# Patient Record
(demographics unavailable — no encounter records)

---

## 2024-10-25 NOTE — HISTORY OF PRESENT ILLNESS
[FreeTextEntry1] : Presents with wife for follow up of cognitive impairment. Patient reports that he feels like his memory is stable, but knows it is getting worse because his wife tells him.  No other complaints. Still has a good sense of humor and no agitation or behavioral disturbance.

## 2024-10-25 NOTE — DATA REVIEWED
[de-identified] : EXAM:  MR BRAIN  PROCEDURE DATE:  11/01/2020     INTERPRETATION:  Short-term memory loss.  Technique: MRI of the brain was performed utilizing sagittal and axial T1, axial T2, axial gradient echo, axial and coronal FLAIR and diffusion imaging.  There are no prior studies available for comparison.  Findings: There are several scattered punctate foci of T2 and Flair signal hyperintensities predominantly within the periventricular white matter that are nonspecific and likely consistent with minimal to mild microvascular disease.. There is no evidence of mass-effect or midline shift. There is no evidence of intra or extra-axial fluid collection.  There is enlargement the sulci, cisterns and ventricles consistent with minimal to mild cerebral and cerebellar volume loss. There is no evidence of hydrocephalus. There is no evidence of acute infarction. Evaluation of the intracranial vascular flow-voids appear within normal limits.  Gradient echo images demonstrate no evidence of abnormal susceptibility changes within the brain parenchyma.  The visualized paranasal sinuses and bilateral mastoid air cells are clear.  Impression: Minimal to mild microvascular disease.  No evidence of acute infarction.

## 2024-10-25 NOTE — ASSESSMENT
[FreeTextEntry1] : 77-year-old man with cognitive impairment, possibly Alzheimer's disease as he does not have vascular risk factors or significant vascular disease on MRI.  -No medications at this time, has tried donepezil and memantine but stopped due to adverse effects -Importance of sleep, diet, exercise reviewed  RTC 1 year

## 2024-10-25 NOTE — PHYSICAL EXAM
[FreeTextEntry1] : General: no apparent distress Mental Status: awake and alert, speech fluent and prosodic with no paraphasic errors, oriented to name and place but not year, somewhat oriented to current events (Kami, Jagdish, Elvia), copies Luria sequence on first try, remembers 0/3 at 5 minutes Cranial Nerves: tracks in all directions, face symmetric, no dysarthria Motor: no abnormal movements, no orbiting or pronator drift Coordination: no dysmetria on finger-nose-finger testing Gait: narrow base, normal stance and stride, no Romberg

## 2025-05-01 NOTE — PHYSICAL EXAM
[FreeTextEntry1] : ============================= NEUROLOGICAL EXAMINATION ================================ [ ] GENERAL: alert, appears well, non-toxic [ ] BEHAVIOR: euthymic affect, pleasant and cooperative. Humorous ironic jokes. No disinhibition, no agitation, no apathy.  [ ] COGNITION: alert, with good basic attention (registration 3/3). Sustained attention good (counting backwards from 20). Complex attention good for WORLD reverse (5/5). Calculation excellent (5/5 very quickly). Language fluent, with normal prosody and phrase length. Comprehension intact to grammatically simple questions, to a 3-step command (3/3), and to a command crossing midline. Repetition intact (2/2 MOCA, 1/1 MMSE). Reading intact (1/1). Writing intact (1/1). Naming intact (2/2). Oriented 1/5 to time (season only; said August rather than May, said 2021 rather than 2025, did not know day/date). Oriented 5/5 to place. Short term recall for an unstructured word list was 0/3 (+1 semantic cue; +1 MCQ choice). Visuoconstruction impaired for interlocking pentagons (0/1, soraida a emir inside a pentagon). Knows president is "big, obnoxious, heavy set" (but thinks he may or may not be president right now) - he cannot list anything that Kami has done (even though he has been watching the news). Folstein MMSE 22/30. [ ] CRANIAL NERVES: PERRL, VFF (binocularly to finger count), EOMI, facial sensation intact bilaterally to PP, face symmetric, facial movements full, tongue protrudes midline [ ] MOTOR: Power 5/5 grossly all limbs without PND. mild fine postural tremor. NO bradykinesia, NO rigidity [ ] CO-ORDINATION: FNF intact bilaterally, [ ] GAIT: mildly stooped posture. narrow based gait. not shuffling or magnetic

## 2025-05-01 NOTE — HISTORY OF PRESENT ILLNESS
[FreeTextEntry1] : =========================== History of Presenting Illness: =========================== 77M followed by Dr Darden for Alzheimer's dementia, here to establish care with cognitive neurology. Per quick chart review - Q repetition started 2019 and had issues remembering days for alternate side parking; MRI brain 2020 with mild SVID only. MOCA 21/30 in 2021. Had mild bilateral action tremor 2022, thought to be physiologic or ET (no parkinsonism on exam). Most recent visit Oct 2024, pt with year disorientation and 0/3 STM; Per Dr Darden note (Oct 2024), "no medications at this time, has tried donepezil and memantine but stopped due to adverse effects" (nightmares, heart palpitations).   Per patient, he is here because 'it's something to do" and "cognitive problems". Per wife, pt has had a decline since seeing Dr Darden Oct 2024. STM loss has worsened. New issue is confusing day with night. There is also another new symptom - gait and walking are now affected. He used to play pickleball but now can only walk short distances.    Cognitive ROS: ---------------------- [] Mood: some mild agitation when tired, but recovers very quickly, has a baseline cheerful self [] Psychosis: no visual hallucinations and no delusions (including no delusions of theft)  [] Sleep: lifelong insomnia. Was on Trazodone but doesn't want to take it. He is scared of having nightmares again (but possibly this was 2/2 donepezil).  No RSBD. Has occasional naps in the day.  [] Appetite: good, no weight changes [] Motor: new onset gait disturbance since late 2024. Not keeping up with his wife, stride shorter and not walking as far. Slowness of movement, even within the apt.  [] Syncope: none    iADLS -- watches TV but cannot totally follow the plot (sports - [employer] fan). Can help with dishes. Uses a cell phone and remembers password, can make calls. Has a little problem remembering computer PW.  ADLs -------- [] Toileting: Self. No UI/FI. [] Bathing: Self. Does not need to be prompted but shaving has declined (missing spots). It is performed correctly. [] Eating: Self. Cleanly with a knife and fork. [] Dressing: Self, correctly. No outfit incoordination, no outfit repetition, no dressing apraxia. Wears context-appropriate clothes (e.g. warm clothes in winter).   =========================== Medications: ===========================  - Vitamin D - MVN - not really a medication person   =========================== Medication Allergies: ===========================  NKDA   =========================== Past Medical History: ===========================  None

## 2025-05-01 NOTE — ASSESSMENT
[FreeTextEntry1] : ===================================== ASSESSMENT ===================================== Alzheimer's Dementia (mild, CDR). MMSE 22/30 (-3 STM, -4 time orientation, -1 visuospatial). Also did not know Kami is president or about any of his actions despite watching the news. Gait is minimally slow and he has a bilateral postural tremor (physiologic vs ET), no parkinsonism.  ===================================== PLAN ===================================== - he is not a medication person, had side effects with Donepezil/Memantine and not interested in infusions - RTC annually, earlier if there are issues